# Patient Record
(demographics unavailable — no encounter records)

---

## 2024-12-12 NOTE — RESULTS/DATA
[TextEntry] : [facility] Exam requested by: LYRIC CHISHOLM DO [address] [address] SITE PERFORMED: [place] Patient: KHRIS YUN YOB: 1988 Phone: [phone] MRN: [id number] Acc: [id number] Date of Exam: 07-   EXAM:  X-RAY CHEST 2 VIEWS, PA AND LATERAL  HISTORY: Physical exam.  TECHNIQUE:  PA and lateral chest radiographs were obtained.    COMPARISON:  None.   FINDINGS: No acute infiltrates are identified. Probable small granuloma left lower lung zone. Pulmonary vascularity is within normal limits. There is no pleural effusion or pneumothorax.  The cardiac silhouette is not enlarged.  The pulmonary lolita and mediastinal contours are within normal limits.   There is no gross evidence for thoracic vertebral compression fracture. No gross osseous abnormality.    IMPRESSION:  No active pulmonary disease.  Thank you for the opportunity to participate in the care of this patient.     CINDY HOLLOWAY MD  - Electronically Signed: 07- 12:53 PM  Physician to Physician Direct Line is: [phone]  ~~~~~~~~~~~~~~~~~~~~~~~~~~~~~~~~~~~~~~~~~~~~~~~~~~~~~~~~~~~~~~~~~~~~~~~~  [facility] ACC: [id number] EXAM: XR CHEST PORTABLE URGENT 1V  PROCEDURE DATE: 01/11/2023    INTERPRETATION: AP chest on January 11, 2023 at 11:33 PM. Patient has chest pain.  COMPARISON: None available.  Heart size normal.  Small calcified granuloma left base noted.  IMPRESSION: Small left base granuloma.  --- End of Report ---      DEBBI CORNEJO MD; Attending Radiologist This document has been electronically signed. Jan 12 2023 9:15AM

## 2024-12-12 NOTE — REASON FOR VISIT
[Initial] : an initial visit [Pre-op Risk Stratification] : pre-op risk stratification [Abnormal CXR/ Chest CT] : an abnormal CXR/ chest CT

## 2024-12-12 NOTE — ASSESSMENT
[FreeTextEntry1] : 36M PMH non-smoker who presents for initial pulmonary evaluation.   - Spirometry today showed FEV1/FVC 83%, FEV1 120%, %, [id number] 113%, %.  - This is a normal spirometry - Had a CXR showing L lung granuloma. - This is a benign finding - He has no pulmonary symptoms - No further imaging is required - He is going for surgery 12/16 on Left eye with Dr. Remigio Garcia [phone] - He is a low risk for post-operative pulmonary complications - No absolute pulmonary contraindications to proceed with surgery - He may f/u with me on a PRN basis  The patient expressed understanding and agreement with the plan as outlined above and accepts responsibility to be compliant with any recommended testing, treatment, and follow-up visits.  All relevant questions and concerns were addressed.  45 minutes of time were spent on the encounter. Medical records were reviewed, including but not limited to hospital records, outpatient records, laboratory data, and diagnostic imaging studies. Greater than 50% of the face-to-face encounter time was spent on counseling and/or coordination of care.   Andrea Tejeda MD, [facility] Medicine [facility] at [employer] [address] [place] [employer] [place], [place] [address] T: [phone] F: [phone]

## 2024-12-12 NOTE — HISTORY OF PRESENT ILLNESS
[Never] : never [TextBox_4] : 36M PMH non-smoker who presents for initial pulmonary evaluation. Spirometry today showed FEV1/FVC 83%, FEV1 120%, %, [id number] 113%, %. Had a CXR showing L lung granuloma. Worked in pool installation in the past. He is never a smoker. Denies issues with sleep or fatigue. No cough, no chest pains. No N/V/D.   Is going for surgery 12/16 on Left eye with Dr. Remigio Garcia [phone]   #[id number]